# Patient Record
Sex: FEMALE | NOT HISPANIC OR LATINO | URBAN - METROPOLITAN AREA
[De-identification: names, ages, dates, MRNs, and addresses within clinical notes are randomized per-mention and may not be internally consistent; named-entity substitution may affect disease eponyms.]

---

## 2017-04-12 ENCOUNTER — EMERGENCY (EMERGENCY)
Facility: HOSPITAL | Age: 57
LOS: 1 days | Discharge: HOME | End: 2017-04-12
Attending: EMERGENCY MEDICINE
Payer: MEDICAID

## 2017-04-12 VITALS
DIASTOLIC BLOOD PRESSURE: 95 MMHG | RESPIRATION RATE: 17 BRPM | HEIGHT: 66 IN | TEMPERATURE: 98 F | SYSTOLIC BLOOD PRESSURE: 145 MMHG | HEART RATE: 84 BPM | OXYGEN SATURATION: 100 %

## 2017-04-12 DIAGNOSIS — R03.0 ELEVATED BLOOD-PRESSURE READING, WITHOUT DIAGNOSIS OF HYPERTENSION: ICD-10-CM

## 2017-04-12 DIAGNOSIS — E11.9 TYPE 2 DIABETES MELLITUS WITHOUT COMPLICATIONS: ICD-10-CM

## 2017-04-12 DIAGNOSIS — M25.572 PAIN IN LEFT ANKLE AND JOINTS OF LEFT FOOT: ICD-10-CM
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
## 2017-04-12 DIAGNOSIS — M54.5 LOW BACK PAIN: ICD-10-CM

## 2017-04-12 DIAGNOSIS — S00.93XA CONTUSION OF UNSPECIFIED PART OF HEAD, INITIAL ENCOUNTER: ICD-10-CM

## 2017-04-12 DIAGNOSIS — Z79.1 LONG TERM (CURRENT) USE OF NON-STEROIDAL ANTI-INFLAMMATORIES (NSAID): ICD-10-CM

## 2017-04-12 DIAGNOSIS — Z79.82 LONG TERM (CURRENT) USE OF ASPIRIN: ICD-10-CM

## 2017-04-12 DIAGNOSIS — S89.92XA UNSPECIFIED INJURY OF LEFT LOWER LEG, INITIAL ENCOUNTER: ICD-10-CM

## 2017-04-12 PROCEDURE — 72110 X-RAY EXAM L-2 SPINE 4/>VWS: CPT | Mod: 26

## 2017-04-12 PROCEDURE — 70450 CT HEAD/BRAIN W/O DYE: CPT

## 2017-04-12 PROCEDURE — 73590 X-RAY EXAM OF LOWER LEG: CPT | Mod: 26,LT

## 2017-04-12 PROCEDURE — 99284 EMERGENCY DEPT VISIT MOD MDM: CPT | Mod: 25

## 2017-04-12 PROCEDURE — 73610 X-RAY EXAM OF ANKLE: CPT | Mod: 26,LT

## 2017-04-12 PROCEDURE — 29505 APPLICATION LONG LEG SPLINT: CPT | Mod: LT

## 2017-04-12 PROCEDURE — 29505 APPLICATION LONG LEG SPLINT: CPT

## 2017-04-12 PROCEDURE — 73562 X-RAY EXAM OF KNEE 3: CPT | Mod: 26,LT

## 2017-04-12 PROCEDURE — 73562 X-RAY EXAM OF KNEE 3: CPT

## 2017-04-12 PROCEDURE — 73590 X-RAY EXAM OF LOWER LEG: CPT

## 2017-04-12 PROCEDURE — 72110 X-RAY EXAM L-2 SPINE 4/>VWS: CPT

## 2017-04-12 PROCEDURE — 73610 X-RAY EXAM OF ANKLE: CPT

## 2017-04-12 PROCEDURE — 99284 EMERGENCY DEPT VISIT MOD MDM: CPT

## 2017-04-12 PROCEDURE — 70450 CT HEAD/BRAIN W/O DYE: CPT | Mod: 26

## 2017-04-12 RX ORDER — IBUPROFEN 200 MG
1 TABLET ORAL
Qty: 20 | Refills: 0 | OUTPATIENT
Start: 2017-04-12 | End: 2017-04-17

## 2017-04-12 NOTE — ED PROCEDURE NOTE - CPROC ED POST PROC CARE GUIDE1
Instructed patient/caregiver regarding signs and symptoms of infection./Elevate the injured extremity as instructed./Keep the cast/splint/dressing clean and dry./Verbal/written post procedure instructions were given to patient/caregiver./Instructed patient/caregiver to follow-up with primary care physician.

## 2017-04-12 NOTE — ED PROVIDER NOTE - ATTENDING CONTRIBUTION TO CARE
HX: pt with left knee pain after fall  PE: left knee pain n/v intact. full range of motion but pain with movment. no major lig laxity.   A/P: knee pain.   brace and dc with ortho f/u

## 2017-04-12 NOTE — ED ADULT TRIAGE NOTE - CHIEF COMPLAINT QUOTE
Slip and fall at [facility] while trying to use bathroom, Denies LOC, head strike. Has L knee arm and hip  pain, no deformity noted. positive ROM

## 2017-04-12 NOTE — ED ADULT NURSE NOTE - OBJECTIVE STATEMENT
Pt a+ox3 w/ c/o neck, B/L shoulder, R elbow and L knee pain s/p fall while in bathroom of [facility], pt denies LOC, pt unable to ambulate, 5/10 pain scale.

## 2017-04-12 NOTE — ED PROVIDER NOTE - MEDICAL DECISION MAKING DETAILS
57 year-old female, presents with mechanical fall and left knee pain. Mildly hypertensive, likely from pain. History and findings suggestive of ligament injury of left knee. Will do xray r/o fracture, CT r/o acute pathology. Will apply knee immobilizer, crutches and ortho follow up.

## 2017-04-12 NOTE — ED PROVIDER NOTE - PHYSICAL EXAMINATION
Left knee anterior-lateral swelling, mild effusion, TTP. painful ROM. Proximal fibular and mid-shin TTP without ecchymosis, depression or deformity. Mild lat mall TTP with FROM of left ankle. Unable to bear weight. Mild midline sacral TTP without deformity or step-offs. No coccygeal TTP. Neck full range of motion without TTP. No focal neuro deficit. Bilateral hip full range of motion without TTP.

## 2017-04-12 NOTE — ED PROVIDER NOTE - OBJECTIVE STATEMENT
57 year-old female, history of diabetes, presents for evaluation s/p fall. 57 year-old female, history of diabetes, presents for evaluation s/p fall earlier today. While getting up from toilet seat, slipped, twisted left knee, fell backwards, landed on buttocks and hit the back of the head on the wall. No LOC. Now c/o left lateral knee pain radiating to mid-shin, continuous, worse with movement, associated with lower back pain and left ankle pain. Denies neck pain, sensory/motor deficit, hip pain, chest pain, dizziness, palpitations, N/V, visual changes or any other complaints.

## 2017-04-12 NOTE — ED ADULT NURSE NOTE - CHIEF COMPLAINT QUOTE
Slip and fall at [facility] while trying to use bathroom, Denies LOC, head strike. Has L knee arm and hip  pain, no deformity noted. positive ROM

## 2017-04-12 NOTE — ED PROVIDER NOTE - PROGRESS NOTE DETAILS
CT shows no acute findings. Xray lumbosacral shows chronic changes. Xrays show no signs of fracture. mechanism for knee injury concerning for ligament injury. Knee immobilizer applied, crutches given, ortho follow up and pain meds Rx. Pt is well appearing walking with steady gait, stable for discharge and follow up without fail with medical doctor. I had a detailed discussion with the patient and/or guardian regarding the historical points, exam findings, and any diagnostic results supporting the discharge diagnosis. Pt educated on care and need for follow up. Strict return instructions and red flag signs and symptoms discussed with patient. Questions answered. Pt shows understanding of discharge information and agrees to follow.

## 2017-11-08 PROBLEM — Z00.00 ENCOUNTER FOR PREVENTIVE HEALTH EXAMINATION: Status: ACTIVE | Noted: 2017-11-08

## 2017-11-09 ENCOUNTER — APPOINTMENT (OUTPATIENT)
Dept: SURGERY | Facility: CLINIC | Age: 57
End: 2017-11-09

## 2018-10-26 ENCOUNTER — TRANSCRIPTION ENCOUNTER (OUTPATIENT)
Age: 58
End: 2018-10-26

## 2019-05-10 NOTE — ED ADULT TRIAGE NOTE - TEMPERATURE IN CELSIUS (DEGREES C)
Future Appointments       Provider Department Center    5/13/2019 9:40 AM Marilee Isaac P.A.-C.; [facility]          ANNUAL WELLNESS VISIT PRE-VISIT PLANNING WITH OUTREACH    1.  If any orders were placed at last visit, do we have Results/Consult Notes?        •  Labs - Labs were not ordered at last office visit.  Note: If patient appointment is for lab review and patient did not complete labs, check with provider if OK to reschedule patient until labs completed.       •  Imaging - Imaging was not ordered at last office visit.       •  Referrals - No referrals were ordered at last office visit.    2.  Immunizations were updated in Epic using WebIZ?:Epic matches WebIZ       •  WebIZ Recommendations: TDAP and SHINGRIX (Shingles)       •  Is patient due for Tdap? YES. Patient was not notified of copay/out of pocket cost.       •  Is patient due for Shingrx? YES. Patient was not notified of copay/out of pocket cost.    3.  Patient has the following Care Path diagnoses on Problem List:  NONE    4.  Orders for overdue Health Maintenance topics pended in Pre-Charting? NO  
36.4

## 2019-05-30 ENCOUNTER — TRANSCRIPTION ENCOUNTER (OUTPATIENT)
Age: 59
End: 2019-05-30

## 2019-12-13 NOTE — ED PROVIDER NOTE - CHIEF COMPLAINT
The patient is a 57y Female complaining of fall. Lot # For Kenalog (Optional): [id number] Lot # (Optional): [id number]

## 2022-04-05 NOTE — ED ADULT NURSE NOTE - CHIEF COMPLAINT
Phone: Corinne           Fax: [phone]                           Outpatient Physical Therapy                                                                            Daily Note    Patient: Raymond Terry : 1970  CSN #: [id number]   Referring Practitioner:  Dr. Eli Reyes    Referral Date : 03/15/22     Date: 2022    Diagnosis: I89.0 Lympgedema BLE/BUE  Treatment Diagnosis: BLE/BUE/abdominal lymphedema    Onset Date: 03/15/22  PT Insurance Information: [facility]  Total # of Visits Approved: 18 Per Physician Order  Total # of Visits to Date: 8  No Show: 0  Canceled Appointment: 0    Pre-Treatment Pain:  5/10  Subjective: Pt reports shes sore today but she worked yesterday. Pt rates current pain a 5/10. Exercises:  Exercise 2: Aquatics: fwd/ retro/ lateral amb 2 laps ea (shallow end)  Exercise 3: Aquatics: sin ex 15x ea (deep)  Exercise 4: Aquatics: FSU/ LSU 10x ea (deep to shallow)  Exercise 5: Aquatics: deep well hangs 5 mins/ deep well bike 5 mins  Exercise 6: Aquatics: seated LAQ/ march/ toe heel raise 15x ea SKTC 3x30 ea    Assessment  Assessment: Initiated aquatic program today with fair tolerance noted. Will continue to progress as Pt tolerates. Activity Tolerance  Activity Tolerance: Patient Tolerated treatment well    Patient Education  Patient Education: Aquatic exercise rationale. Pt verbalized/demonstrated good understanding:     [x] Yes         [] No, pt required further clarification.      Post Treatment Pain:  5/10    Plan  Times per week: 3x/wk  Plan weeks: 4-6 weks    Goals  (Total # of Visits to Date: 8)      Short term goals  Time Frame for Short term goals: 3 weeks  Short term goal 1: Pt willbe educated on her POC and HEP-met  Short term goal 2: Pt will initiate pump protocol to BLE/BUE-met    Long term goals  Time Frame for Long term goals : 6 weeks  Long term goal 1: Pt will be safe and independent with her The patient is a 57y Female complaining of fall. lymphedema management-progressing  Long term goal 2: Pt will demonstrate a 2-3cm decrease in girth measurements in BUE/BLE in order to improve quality of life-progressing  Long term goal 3: Pt will be fitted for an at home pump  Long term goal 4: Pt will report 50% improvement in symptoms-progressing    Minutes Tracking:  Time In: 0274  Time Out: 0845  Minutes: 47  Timed Code Treatment Minutes: [place], [place]       Date: 4/5/2022

## 2023-02-15 NOTE — ED ADULT NURSE NOTE - TEMPLATE LIST FOR HEAD TO TOE ASSESSMENT
Patient on ASA and Plavix.   Not symptomatic.  Benefits of continuing ASA and Plavix outweigh risk of stopping treatment so will continue as long as anemia not worsening   Orthopedic